# Patient Record
Sex: MALE
[De-identification: names, ages, dates, MRNs, and addresses within clinical notes are randomized per-mention and may not be internally consistent; named-entity substitution may affect disease eponyms.]

---

## 2018-02-22 ENCOUNTER — HOSPITAL ENCOUNTER (OUTPATIENT)
Dept: HOSPITAL 14 - H.ER | Age: 78
Setting detail: OBSERVATION
Discharge: HOME | End: 2018-02-22
Attending: FAMILY MEDICINE | Admitting: FAMILY MEDICINE
Payer: MEDICARE

## 2018-02-22 VITALS — RESPIRATION RATE: 18 BRPM | DIASTOLIC BLOOD PRESSURE: 94 MMHG | SYSTOLIC BLOOD PRESSURE: 138 MMHG | HEART RATE: 62 BPM

## 2018-02-22 VITALS — TEMPERATURE: 98.6 F

## 2018-02-22 DIAGNOSIS — I10: ICD-10-CM

## 2018-02-22 DIAGNOSIS — Z79.82: ICD-10-CM

## 2018-02-22 DIAGNOSIS — R07.9: Primary | ICD-10-CM

## 2018-02-22 DIAGNOSIS — Z95.1: ICD-10-CM

## 2018-02-22 DIAGNOSIS — I25.10: ICD-10-CM

## 2018-02-22 DIAGNOSIS — E78.00: ICD-10-CM

## 2018-02-22 DIAGNOSIS — E78.5: ICD-10-CM

## 2018-02-22 LAB
APTT BLD: 30 SECONDS (ref 25.6–37.1)
BASOPHILS # BLD AUTO: 0 K/UL (ref 0–0.2)
BASOPHILS # BLD AUTO: 0 K/UL (ref 0–0.2)
BASOPHILS NFR BLD: 0.7 % (ref 0–2)
BASOPHILS NFR BLD: 0.7 % (ref 0–2)
BNP SERPL-MCNC: 507 PG/ML (ref 0–900)
BUN SERPL-MCNC: 24 MG/DL (ref 9–20)
BUN SERPL-MCNC: 25 MG/DL (ref 9–20)
CALCIUM SERPL-MCNC: 9.2 MG/DL (ref 8.4–10.2)
CALCIUM SERPL-MCNC: 9.3 MG/DL (ref 8.4–10.2)
EOSINOPHIL # BLD AUTO: 0.1 K/UL (ref 0–0.7)
EOSINOPHIL # BLD AUTO: 0.1 K/UL (ref 0–0.7)
EOSINOPHIL NFR BLD: 1.8 % (ref 0–4)
EOSINOPHIL NFR BLD: 1.9 % (ref 0–4)
ERYTHROCYTE [DISTWIDTH] IN BLOOD BY AUTOMATED COUNT: 14.3 % (ref 11.5–14.5)
ERYTHROCYTE [DISTWIDTH] IN BLOOD BY AUTOMATED COUNT: 14.6 % (ref 11.5–14.5)
GFR NON-AFRICAN AMERICAN: > 60
GFR NON-AFRICAN AMERICAN: > 60
HGB BLD-MCNC: 14.4 G/DL (ref 12–18)
HGB BLD-MCNC: 15.2 G/DL (ref 12–18)
INR PPP: 1 (ref 0.9–1.2)
LYMPHOCYTES # BLD AUTO: 0.9 K/UL (ref 1–4.3)
LYMPHOCYTES # BLD AUTO: 1.5 K/UL (ref 1–4.3)
LYMPHOCYTES NFR BLD AUTO: 18.3 % (ref 20–40)
LYMPHOCYTES NFR BLD AUTO: 21.9 % (ref 20–40)
MCH RBC QN AUTO: 29.6 PG (ref 27–31)
MCH RBC QN AUTO: 29.9 PG (ref 27–31)
MCHC RBC AUTO-ENTMCNC: 33.5 G/DL (ref 33–37)
MCHC RBC AUTO-ENTMCNC: 34.1 G/DL (ref 33–37)
MCV RBC AUTO: 87.8 FL (ref 80–94)
MCV RBC AUTO: 88.4 FL (ref 80–94)
MONOCYTES # BLD: 0.3 K/UL (ref 0–0.8)
MONOCYTES # BLD: 0.5 K/UL (ref 0–0.8)
MONOCYTES NFR BLD: 5.8 % (ref 0–10)
MONOCYTES NFR BLD: 6.8 % (ref 0–10)
NEUTROPHILS # BLD: 3.8 K/UL (ref 1.8–7)
NEUTROPHILS # BLD: 4.6 K/UL (ref 1.8–7)
NEUTROPHILS NFR BLD AUTO: 68.7 % (ref 50–75)
NEUTROPHILS NFR BLD AUTO: 73.4 % (ref 50–75)
NRBC BLD AUTO-RTO: 0.1 % (ref 0–0)
NRBC BLD AUTO-RTO: 0.3 % (ref 0–0)
PLATELET # BLD: 173 K/UL (ref 130–400)
PLATELET # BLD: 176 K/UL (ref 130–400)
PMV BLD AUTO: 9.2 FL (ref 7.2–11.7)
PMV BLD AUTO: 9.2 FL (ref 7.2–11.7)
PROTHROMBIN TIME: 11.6 SECONDS (ref 9.8–13.1)
RBC # BLD AUTO: 4.79 MIL/UL (ref 4.4–5.9)
RBC # BLD AUTO: 5.14 MIL/UL (ref 4.4–5.9)
WBC # BLD AUTO: 5.2 K/UL (ref 4.8–10.8)
WBC # BLD AUTO: 6.7 K/UL (ref 4.8–10.8)

## 2018-02-22 PROCEDURE — 84484 ASSAY OF TROPONIN QUANT: CPT

## 2018-02-22 PROCEDURE — 80048 BASIC METABOLIC PNL TOTAL CA: CPT

## 2018-02-22 PROCEDURE — 99285 EMERGENCY DEPT VISIT HI MDM: CPT

## 2018-02-22 PROCEDURE — 85025 COMPLETE CBC W/AUTO DIFF WBC: CPT

## 2018-02-22 PROCEDURE — 83880 ASSAY OF NATRIURETIC PEPTIDE: CPT

## 2018-02-22 PROCEDURE — 85730 THROMBOPLASTIN TIME PARTIAL: CPT

## 2018-02-22 PROCEDURE — 71045 X-RAY EXAM CHEST 1 VIEW: CPT

## 2018-02-22 PROCEDURE — 85610 PROTHROMBIN TIME: CPT

## 2018-02-22 PROCEDURE — 93005 ELECTROCARDIOGRAM TRACING: CPT

## 2018-02-22 NOTE — RAD
HISTORY:

chest pain  



COMPARISON:

No prior. 



FINDINGS:



LUNGS:

No active pulmonary disease.



PLEURA:

Eventration of the right hemidiaphragm. No significant pleural 

effusion identified, no pneumothorax apparent.



CARDIOVASCULAR:

Prior sternotomy with sternal wires and surgical clips in place.  

Atherosclerotic aortic calcifications.  Cardiomediastinal silhouette 

enlarged.



OSSEOUS STRUCTURES:

Degenerative changes.



VISUALIZED UPPER ABDOMEN:

Normal.



OTHER FINDINGS:

None.



IMPRESSION:

No active disease.

## 2018-02-22 NOTE — ED PDOC
HPI: Chest Pain


Time Seen by Provider: 18 00:45


Chief Complaint (Nursing): Chest Pain


Chief Complaint (Provider): Chest Pain


History Per: Patient


History/Exam Limitations: no limitations


Onset/Duration Of Symptoms: Hrs (x2), Sudden Onset


Current Symptoms Are (Timing): Gone Now


Associated Symptoms: denies: Nausea, Dyspnea, Diaphoresis


Nitro Therapy Administered: 1, Per ED


Additional Complaint(s): 





77 year old male presents to ED with complaints of acute onset chest pain and 

has a past medical history of CAD, dyslipidemia, HTN, and CABG. Patient notes 

chest pain occurred x2 hours ago and resolved within seconds. Patient is 

asymptomatic upon ED arrival. (-) nausea, vomiting, diaphoresis, or SOB. 





PCP: Dr Rao





- Risk Factors


PE Risk Factors: 


   Neg: Active Cancer, Previous DVT, Previous PE


TAD Risk Factors: Pos: Hypertension





Past Medical History


Reviewed: Historical Data, Nursing Documentation, Vital Signs


Vital Signs: 


 Last Vital Signs











Temp  98.6 F   18 13:52


 


Pulse  62   18 13:52


 


Resp  18   18 13:52


 


BP  138/94 H  18 13:52


 


Pulse Ox  98   18 13:52














- Medical History


PMH: CAD, HTN, Hypercholesterolemia





- Surgical History


Surgical History: CABG ()





- Family History


Family History: States: Unknown Family Hx





- Social History


Current smoker - smoking cessation education provided: No


Ex-Smoker (has not smoked in the last 12 months): No


Alcohol: None


Drugs: Denies





- Home Medications


Home Medications: 


 Ambulatory Orders











 Medication  Instructions  Recorded


 


Aspirin [Lo-Dose Aspirin EC] 81 mg PO DAILY 18


 


Atorvastatin [Lipitor] 40 mg PO DAILY 18


 


Carvedilol [Coreg] 6.25 mg PO BID 18


 


Furosemide [Lasix] 20 mg PO DAILY 18


 


Lisinopril [Zestril] 10 mg PO DAILY 18














- Allergies


Allergies/Adverse Reactions: 


 Allergies











Allergy/AdvReac Type Severity Reaction Status Date / Time


 


prochlorperazine Allergy  VOMITING Verified 18 00:18





[From Compazine]     














AKHIL Risk Score for UA/NSTEMI





- AKHIL Risk Score


Age > 64: YES


3 or more CAD Risk Factors: YES


Known CAD (Stenosis greater than 50%): YES


AKHIL Score: 3


Risk %: 13%





Curb-65 Severity Score





- CURB-65 Severity Score


Confusion: No


Respiratory Rate greater than/equal to 30: No


Systolic BP <90 or Diastolic BP less than/equal 60mmHg: Yes


Age >64: Yes


Curb-65 Score: 2


Percentage 30-day mortality: 6.8%





Wells Criteria for PE





- Wells Criteria for Pulmonary Embolism


Clinical Signs and Symptoms of DVT: No


P.E is #1 Diagnosis, or Equally Likely: No


Heart Rate >100: No


Immobilization at least 3 days;Surgery previous 4 weeks: No


Previous, objectively diagnosed PE or DVT: No


Hemoptysis: No


Malignancy w/treatment within 6 months, or palliative: No


Total Score: 0





Review of Systems


ROS Statement: Except As Marked, All Systems Reviewed And Found Negative


Constitutional: Negative for: Sweats


Cardiovascular: Positive for: Chest Pain (resolved)


Respiratory: Negative for: Shortness of Breath


Gastrointestinal: Negative for: Nausea, Vomiting





Physical Exam





- Reviewed


Nursing Documentation Reviewed: Yes


Vital Signs Reviewed: Yes





- Physical Exam


Appears: Positive for: Non-toxic, No Acute Distress


Skin: Positive for: Normal Color, Warm, Dry


Eye Exam: Positive for: Normal appearance


Neck: Positive for: Normal


Cardiovascular/Chest: Positive for: Regular Rate, Rhythm.  Negative for: Murmur


Respiratory: Positive for: Normal Breath Sounds.  Negative for: Respiratory 

Distress


Gastrointestinal/Abdominal: Positive for: Soft.  Negative for: Tenderness


Back: Positive for: Normal Inspection


Extremity: Positive for: Normal ROM.  Negative for: Deformity


Neurologic/Psych: Positive for: Alert, Oriented.  Negative for: Motor/Sensory 

Deficits





- Laboratory Results


Result Diagrams: 


 18 10:00





 18 10:00





- ECG


O2 Sat by Pulse Oximetry: 93 (RA)


Pulse Ox Interpretation: Abnormal





Medical Decision Making


Medical Decision Makin


Initial impression: chest pain in setting of CAD


Initial plan:


* EKG


* Labs


* Trop I


* PTT/PT


* CXR





0118


* OBS TELE ADMISSION (Dr. Chung)





0132


* ASA 324mg PO


* Nitroglycerin 2% 1 ea TOP


* Re-eval





Labs reviewed show no clinically significant abnormalities.  Patient will be 

hospitalized for further observation 


DX Chest Pain


Fair


Scribe Attestation:


Documented by Stephanie Joens acting as a scribe for Rashawn Rizvi MD.





MD Scribe Attestation: 


All medical record entries made by the Teja were at my direction and 

personally dictated by me. I have reviewed the chart and agree that the record 

accurately reflects my personal performance of the history, physical exam, 

medical decision making, and the department course for this patient. I have 

also personally directed, reviewed, and agree with the discharge instructions 

and disposition.





Disposition





- Clinical Impression


Clinical Impression: 


 Chest pain








- Disposition


Disposition Time: 01:00


Condition: FAIR





- Pt Status Changed To:


Hospital Disposition Of: Observation

## 2018-02-22 NOTE — CP.PCM.HP
History of Present Illness





- History of Present Illness


History of Present Illness: 





78 y/o M with PMhx of CAD, CABG, HTN presented to ED c/o CP. At the time of 

arrival CP was already resolved. Patient was admitted for CP to R/O ACS. Denies 

SOB, palpitations, headache or paresthesias. As per patient he was being 

compliant with meds and his meds are managed by his daughter which is a 

physician in [place]. 





Present on Admission





- Present on Admission


Any Indicators Present on Admission: Yes





Review of Systems





- Review of Systems


All systems: reviewed and no additional remarkable complaints except





- Cardiovascular


Cardiovascular: Chest Pain





Past Patient History





- Past Social History


Alcohol: None


Drugs: Denies





- CARDIAC


Hx Hypercholesterolemia: Yes


Hx Hypertension: Yes





- PSYCHIATRIC


Hx Substance Use: No





- SURGICAL HISTORY


Hx Coronary Artery Bypass Graft: Yes (2009)





Meds


Allergies/Adverse Reactions: 


 Allergies











Allergy/AdvReac Type Severity Reaction Status Date / Time


 


prochlorperazine Allergy  VOMITING Verified 02/22/18 00:18





[From Compazine]     














Physical Exam





- Constitutional


Appears: Non-toxic, No Acute Distress





- Head Exam


Head Exam: NORMAL INSPECTION





- Eye Exam


Eye Exam: EOMI, PERRL





- ENT Exam


ENT Exam: Mucous Membranes Moist





- Respiratory Exam


Respiratory Exam: Clear to Auscultation Bilateral, NORMAL BREATHING PATTERN.  

absent: Decreased Breath Sounds





- Cardiovascular Exam


Cardiovascular Exam: REGULAR RHYTHM, +S1, +S2.  absent: Gallop





- GI/Abdominal Exam


GI & Abdominal Exam: Normal Bowel Sounds, Soft.  absent: Tenderness





- Neurological Exam


Neurological exam: Alert, Normal Gait, Oriented x3





- Psychiatric Exam


Psychiatric exam: Normal Affect, Normal Mood





- Skin


Skin Exam: Normal Color, Warm





Results





- Vital Signs


Recent Vital Signs: 





 Last Vital Signs











Temp  98.6 F   02/22/18 08:08


 


Pulse  53 L  02/22/18 10:08


 


Resp  21   02/22/18 10:08


 


BP  133/79   02/22/18 10:08


 


Pulse Ox  97   02/22/18 10:08














- Labs


Result Diagrams: 


 02/22/18 10:00





 02/22/18 10:00


Labs: 





 Laboratory Results - last 24 hr











  02/22/18 02/22/18 02/22/18





  01:26 01:26 01:26


 


WBC    6.7


 


RBC    4.79


 


Hgb    14.4


 


Hct    42.1


 


MCV    87.8


 


MCH    29.9


 


MCHC    34.1


 


RDW    14.3


 


Plt Count    176


 


MPV    9.2


 


Neut % (Auto)    68.7


 


Lymph % (Auto)    21.9


 


Mono % (Auto)    6.8


 


Eos % (Auto)    1.9


 


Baso % (Auto)    0.7


 


Neut # (Auto)    4.6


 


Lymph # (Auto)    1.5


 


Mono # (Auto)    0.5


 


Eos # (Auto)    0.1


 


Baso # (Auto)    0.0


 


PT   11.6 


 


INR   1.0 


 


APTT   30.0 


 


Sodium  137  


 


Potassium  3.2 L  


 


Chloride  97 L  


 


Carbon Dioxide  29  


 


Anion Gap  14  


 


BUN  25 H  


 


Creatinine  0.9  


 


Est GFR ( Amer)  > 60  


 


Est GFR (Non-Af Amer)  > 60  


 


Random Glucose  168 H  


 


Calcium  9.2  


 


Troponin I  0.0300  


 


NT-Pro-B Natriuret Pep  507  














  02/22/18 02/22/18





  10:00 10:00


 


WBC   5.2


 


RBC   5.14


 


Hgb   15.2


 


Hct   45.5


 


MCV   88.4


 


MCH   29.6


 


MCHC   33.5


 


RDW   14.6 H


 


Plt Count   173


 


MPV   9.2


 


Neut % (Auto)   73.4


 


Lymph % (Auto)   18.3 L


 


Mono % (Auto)   5.8


 


Eos % (Auto)   1.8


 


Baso % (Auto)   0.7


 


Neut # (Auto)   3.8


 


Lymph # (Auto)   0.9 L


 


Mono # (Auto)   0.3


 


Eos # (Auto)   0.1


 


Baso # (Auto)   0.0


 


PT  


 


INR  


 


APTT  


 


Sodium  138 


 


Potassium  3.8 


 


Chloride  97 L 


 


Carbon Dioxide  31 H 


 


Anion Gap  14 


 


BUN  24 H 


 


Creatinine  1.0 


 


Est GFR ( Amer)  > 60 


 


Est GFR (Non-Af Amer)  > 60 


 


Random Glucose  163 H 


 


Calcium  9.3 


 


Troponin I  0.0300 


 


NT-Pro-B Natriuret Pep  














Assessment & Plan





- Assessment and Plan (Free Text)


Assessment: 





CP to rule out ACS


CP Resolved


VS stable


Trops x1 normal


EKG: Chronic ischemic changes


Trops q8h


repeat EKG


Home meds reconciled


Can be DC home if asymptomatic after 3rd trop negative

## 2018-02-22 NOTE — CARD
--------------- APPROVED REPORT --------------





EKG Measurement

Heart [id number]

WV [id number]

[id number]

[id number]

[id number]



<Conclusion>

Sinus bradycardia with premature atrial complexes 

Left ventricular hypertrophy with repolarization abnormality

Inferior infarct, age undetermined

Anterolateral infarct, age undetermined

Abnormal ECG

## 2018-02-23 VITALS — OXYGEN SATURATION: 93 %

## 2018-02-23 NOTE — CARD
--------------- APPROVED REPORT --------------





EKG Measurement

Heart [id number]

OR [id number]

[id number]

[id number]

[id number]



<Conclusion>

Sinus bradycardia with premature atrial complexes

Inferior infarct, age undetermined

Anteroseptal infarct, age undetermined

T wave abnormality, consider lateral ischemia

Abnormal ECG

## 2018-07-10 ENCOUNTER — HOSPITAL ENCOUNTER (EMERGENCY)
Dept: HOSPITAL 14 - H.ER | Age: 78
Discharge: LEFT BEFORE DISCHARGE | End: 2018-07-10
Payer: COMMERCIAL

## 2018-07-10 VITALS — HEART RATE: 68 BPM | RESPIRATION RATE: 20 BRPM

## 2018-07-10 VITALS — SYSTOLIC BLOOD PRESSURE: 137 MMHG | DIASTOLIC BLOOD PRESSURE: 65 MMHG | TEMPERATURE: 98.6 F | OXYGEN SATURATION: 98 %

## 2018-07-10 DIAGNOSIS — I10: ICD-10-CM

## 2018-07-10 DIAGNOSIS — R10.9: ICD-10-CM

## 2018-07-10 DIAGNOSIS — R07.9: ICD-10-CM

## 2018-07-10 DIAGNOSIS — K92.2: Primary | ICD-10-CM

## 2018-07-10 DIAGNOSIS — Z87.891: ICD-10-CM

## 2018-07-10 DIAGNOSIS — I25.10: ICD-10-CM

## 2018-07-10 DIAGNOSIS — Z79.82: ICD-10-CM

## 2018-07-10 DIAGNOSIS — E78.00: ICD-10-CM

## 2018-07-10 DIAGNOSIS — Z95.1: ICD-10-CM

## 2018-07-10 LAB
ALBUMIN SERPL-MCNC: 4 G/DL (ref 3.5–5)
ALBUMIN/GLOB SERPL: 1.4 {RATIO} (ref 1–2.1)
ALT SERPL-CCNC: 31 U/L (ref 21–72)
APTT BLD: 34.1 SECONDS (ref 25.6–37.1)
AST SERPL-CCNC: 30 U/L (ref 17–59)
BASOPHILS # BLD AUTO: 0.1 K/UL (ref 0–0.2)
BASOPHILS NFR BLD: 1 % (ref 0–2)
BUN SERPL-MCNC: 24 MG/DL (ref 9–20)
CALCIUM SERPL-MCNC: 9.3 MG/DL (ref 8.4–10.2)
EOSINOPHIL # BLD AUTO: 0.2 K/UL (ref 0–0.7)
EOSINOPHIL NFR BLD: 4 % (ref 0–4)
ERYTHROCYTE [DISTWIDTH] IN BLOOD BY AUTOMATED COUNT: 14.6 % (ref 11.5–14.5)
GFR NON-AFRICAN AMERICAN: > 60
HGB BLD-MCNC: 15.4 G/DL (ref 12–18)
INR PPP: 1 (ref 0.9–1.2)
LYMPHOCYTES # BLD AUTO: 1.2 K/UL (ref 1–4.3)
LYMPHOCYTES NFR BLD AUTO: 20.8 % (ref 20–40)
MCH RBC QN AUTO: 29.2 PG (ref 27–31)
MCHC RBC AUTO-ENTMCNC: 33.1 G/DL (ref 33–37)
MCV RBC AUTO: 88.4 FL (ref 80–94)
MONOCYTES # BLD: 0.4 K/UL (ref 0–0.8)
MONOCYTES NFR BLD: 7.6 % (ref 0–10)
NEUTROPHILS # BLD: 3.9 K/UL (ref 1.8–7)
NEUTROPHILS NFR BLD AUTO: 66.6 % (ref 50–75)
NRBC BLD AUTO-RTO: 0 % (ref 0–0)
PLATELET # BLD: 149 K/UL (ref 130–400)
PMV BLD AUTO: 9.4 FL (ref 7.2–11.7)
PROTHROMBIN TIME: 10.5 SECONDS (ref 9.8–13.1)
RBC # BLD AUTO: 5.28 MIL/UL (ref 4.4–5.9)
WBC # BLD AUTO: 5.9 K/UL (ref 4.8–10.8)

## 2018-07-10 NOTE — RAD
Date of service: 



07/10/2018



HISTORY:

dyspnea  



COMPARISON:

02/22/2018 



FINDINGS:



LUNGS:

Mild bibasilar atelectasis/ scarring changes left greater than right



PLEURA:

No significant pleural effusion identified, no pneumothorax apparent.



CARDIOVASCULAR:

Cardiomegaly.  Sternotomy wires and CABG clips again noted



OSSEOUS STRUCTURES:

No significant abnormalities.



VISUALIZED UPPER ABDOMEN:

Normal.



OTHER FINDINGS:

None.



IMPRESSION:

Mild bibasilar atelectasis/ scarring changes left greater than right

## 2018-07-10 NOTE — ED PDOC
HPI: Abdomen


Time Seen by Provider: 07/10/18 07:10


Chief Complaint (Nursing): Abdominal Pain


Chief Complaint (Provider): Abdominal Pain and Black and Bloody Stools


History Per: Patient


History/Exam Limitations: no limitations


Onset/Duration Of Symptoms: Days


Outside of [place] travel?: No


Current Symptoms Are (Timing): Still Present


Severity: None


Quality Of Discomfort: "Pain"


Associated Symptoms: denies: Fever, Chills, Vomiting, Diarrhea, Chest Pain


Exacerbating Factors: None


Alleviating Factors: None


Additional Complaint(s): 


78 year old male with a past medical history of coronary artery disease, 

hypertension and hypercholesterolemia presents to the emergency department 

complaining of black and bloody stools associated with abdominal pain. Patient 

states that 3 months ago he had an episode of black stools which resolved 

spontaneously at that time but has been having intermittent episodes ever 

since. He reports that the most recent episodes of black stools occurred on 

Saturday and . Patient also notes some shortness of breath and weakness. 

Denies leg pain, dizziness, numbness/tingling, nausea, vomiting, diarrhea, 

chills. 


Chest pain yesterday, not today.





PMD: Dr. Justyna Rao ([place])








Past Medical History


Reviewed: Historical Data, Nursing Documentation, Vital Signs


Vital Signs: 


 Last Vital Signs











Temp  98.6 F   07/10/18 07:00


 


Pulse  67   07/10/18 07:00


 


Resp  16   07/10/18 07:00


 


BP  137/65   07/10/18 07:00


 


Pulse Ox  98   07/10/18 07:54














- Medical History


PMH: CAD, HTN, Hypercholesterolemia





- Surgical History


Surgical History: CABG ()





- Family History


Family History: States: Unknown Family Hx





- Social History


Current smoker - smoking cessation education provided: No


Ex-Smoker (has not smoked in the last 12 months): No


Alcohol: None


Drugs: Denies





- Home Medications


Home Medications: 


 Ambulatory Orders











 Medication  Instructions  Recorded


 


Aspirin [Lo-Dose Aspirin EC] 81 mg PO DAILY 18


 


Atorvastatin [Lipitor] 40 mg PO DAILY 18


 


Carvedilol [Coreg] 6.25 mg PO BID 18


 


Furosemide [Lasix] 20 mg PO DAILY 18


 


Lisinopril [Zestril] 10 mg PO DAILY 18














- Allergies


Allergies/Adverse Reactions: 


 Allergies











Allergy/AdvReac Type Severity Reaction Status Date / Time


 


prochlorperazine Allergy  VOMITING Verified 18 00:18





[From Compazine]     














Review of Systems


ROS Statement: Except As Marked, All Systems Reviewed And Found Negative


Constitutional: Positive for: Weakness.  Negative for: Fever, Chills


Cardiovascular: Positive for: Chest Pain


Respiratory: Positive for: Shortness of Breath


Gastrointestinal: Positive for: Abdominal Pain, Hematochezia, Other (Black and 

Bloody Stools).  Negative for: Nausea, Vomiting, Diarrhea, Rectal Pain


Neurological: Positive for: Weakness.  Negative for: Numbness, Dizziness





Physical Exam





- Reviewed


Nursing Documentation Reviewed: Yes


Vital Signs Reviewed: Yes





- Physical Exam


Appears: Positive for: Non-toxic, No Acute Distress


Head Exam: Positive for: ATRAUMATIC, NORMAL INSPECTION, NORMOCEPHALIC


Skin: Positive for: Normal Color, Warm, Dry.  Negative for: Rash


Eye Exam: Positive for: Normal appearance, EOMI, PERRL.  Negative for: Nystagmus


ENT: Positive for: Normal ENT Inspection.  Negative for: Nasal Congestion, 

Tonsillar Exudate, Tonsillar Swelling


Neck: Positive for: Normal, Painless ROM, Supple


Cardiovascular/Chest: Positive for: Regular Rate, Rhythm, Chest Non Tender.  

Negative for: Gallop, Murmur, Tachycardia


Respiratory: Positive for: Normal Breath Sounds.  Negative for: Rales, Rhonchi


Gastrointestinal/Abdominal: Positive for: Bowel Sounds, Soft, Tenderness (mild 

diffuse tenderness).  Negative for: Mass, Guarding, Rebound


Rectal: Positive for: Normal Exam (No gross stool), Rectal Tone Is: (intact).  

Negative for: Hemorrhoids, Mass, Tenderness


Extremity: Positive for: Normal ROM.  Negative for: Tenderness, Deformity, 

Swelling


Neurologic/Psych: Positive for: Alert, CNs II-XII, Oriented, Gait.  Negative for

: Motor/Sensory Deficits





- ECG


O2 Sat by Pulse Oximetry: 98 (RA)


Pulse Ox Interpretation: Normal





- Progress


ED Course And Treament: 





806:  Stable.  AAOx3.  Pt. refusing to stay in the hospital.  Ramirez not want any 

work up done at this time.  No labs, imaging, or admission.  Will go AMA.  He 

has capacity to make decisions.  Aware of possible death or decreased 

functioning from not getting evaluation and treatment for his chest pain, abd 

pain, gi bleeding.  





Medical Decision Making


Medical Decision Makin


Initial Impression:


78 year old male presenting with black bloody stools and abdominal pain





Initial Plan:


* ABO/RH Type 


* Type and Screen 


* CT ABD PELVIS PO & IV Contrast 


* EKG 


* CMP 


* Troponin 


* CBC 


* Partial Thrmboplastin 


* Prothrombin Time 


* CXR 


* NS 1000 ml  mls/hr 


* Iohexol 50 mL PO 


* Protonix inj 80 mg IVP 


* Occult Blood, Stool


* Reevaluation








--------------------------------------------------------------------------------

-------------------------


Documented by Sharyn Jeff acting as a scribe for Pako Dc MD. 





All medical record entries made by the Scribe were at my direction and 

personally dictated by me. I have reviewed the chart and agree that the record 

accurately reflects my personal performance of the history, physical exam, 

medical decision making, and the department course for this patient. I have 

also personally directed, reviewed, and agree with the discharge instructions 

and disposition.














Disposition





- Clinical Impression


Clinical Impression: 


 GI bleed, Abdominal pain, Chest pain








- Disposition


Referrals: 


[facility] [Outside]


Disposition: Against Medical Advice


Disposition Time: 08:08


Condition: FAIR


Additional Instructions: 


Return soon as possible for further evaluation and treatment.  You are going 

against medical advice.  You need blood work, treatment, catscans, and x-ray 

for evaluation; you are refusing.  You are aware of possible death or decreased 

functioning from chest pain, stomach bleeding, and abdominal pain.  


Instructions:  Chest Pain, Acute Abdomen (Belly Pain), Gastrointestinal Bleeding


Forms:  [employer] (English)

## 2018-07-11 NOTE — CARD
--------------- APPROVED REPORT --------------





Date of service: 07/10/2018



EKG Measurement

Heart [id number]

NV 208P39

[id number]

[id number]

[id number]



<Conclusion>

Sinus rhythm with premature atrial complexes

Possible Inferior infarct, age undetermined

Anteroseptal infarct, age undetermined

T wave abnormality, consider lateral ischemia

Abnormal ECG

## 2018-12-08 ENCOUNTER — HOSPITAL ENCOUNTER (EMERGENCY)
Dept: HOSPITAL 14 - H.ER | Age: 78
LOS: 1 days | Discharge: HOME | End: 2018-12-09
Payer: MEDICARE

## 2018-12-08 DIAGNOSIS — G89.29: ICD-10-CM

## 2018-12-08 DIAGNOSIS — M54.5: Primary | ICD-10-CM

## 2018-12-08 DIAGNOSIS — I10: ICD-10-CM

## 2018-12-09 VITALS — SYSTOLIC BLOOD PRESSURE: 124 MMHG | DIASTOLIC BLOOD PRESSURE: 62 MMHG | RESPIRATION RATE: 16 BRPM | HEART RATE: 72 BPM

## 2018-12-09 VITALS — OXYGEN SATURATION: 99 % | TEMPERATURE: 98 F

## 2018-12-09 NOTE — RAD
Date of service: 



12/09/2018



PROCEDURE:  Radiographs of the Lumbar Spine.



HISTORY:

acute on chronic back pain







COMPARISON:

No prior.



FINDINGS:



BONES:

Normal alignment. No listhesis. No fracture.



DISC SPACES:

Disc space narrowing at L4-5 and L5-S1 with spondylosis.



OTHER FINDINGS:

None.



IMPRESSION:

No acute fracture.

## 2018-12-09 NOTE — ED PDOC
HPI: Back


Time Seen by Provider: 12/09/18 00:49


Chief Complaint (Nursing): Back Pain


Chief Complaint (Provider): low back pain


History Per: Patient


History/Exam Limitations: no limitations


Onset/Duration Of Symptoms: Days (1)


Current Symptoms Are (Timing): Still Present


Exacerbating Factor(s): Turning, Movement


Additional Complaint(s): 





77 y/o male presents for evaluation of acute on chronic low back pain x 1 day.  

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 

ago; states every now and then he gets exacerbations of pain.  Patient states he

has not had imaging on his back for over 10 years, requesting xray.  Denies new 

trauma, fever, nausea/vomiting, chest pain, abdominal pain, numbness/weakness 

lower extremities, bowel/bladder incontinence, urinary symptoms. 





Past Medical History


Reviewed: Historical Data, Nursing Documentation, Vital Signs


Vital Signs: 


                                Last Vital Signs











Temp  98 F   12/09/18 00:16


 


Pulse  60   12/09/18 00:16


 


Resp  18   12/09/18 00:16


 


BP  127/67   12/09/18 00:16


 


Pulse Ox  99   12/09/18 00:16














- Medical History


PMH: CAD, HTN, Hypercholesterolemia





- Family History


Family History: States: No Known Family Hx





- Living Arrangements


Living Arrangements: Alone





- Social History


Current smoker - smoking cessation education provided: No


Alcohol: None


Drugs: Denies





- Allergies


Allergies/Adverse Reactions: 


                                    Allergies











Allergy/AdvReac Type Severity Reaction Status Date / Time


 


prochlorperazine Allergy  RASH Verified 12/09/18 00:15





[From Compazine]     














Review of Systems


ROS Statement: Except As Marked, All Systems Reviewed And Found Negative


Musculoskeletal: Positive for: Back Pain





Physical Exam





- Reviewed


Nursing Documentation Reviewed: Yes


Vital Signs Reviewed: Yes





- Physical Exam


Appears: Positive for: Well, Non-toxic, No Acute Distress (sleeping)


Head Exam: Positive for: ATRAUMATIC, NORMAL INSPECTION, NORMOCEPHALIC


Skin: Positive for: Normal Color


Cardiovascular/Chest: Positive for: Regular Rate, Rhythm


Respiratory: Positive for: Normal Breath Sounds


Gastrointestinal/Abdominal: Positive for: Normal Exam


Back: Positive for: Vertebral Tenderness (diffuse lspine; no skin changes, bony 

deformity), Muscle Spasm (bilateral lspine paravertebral tenderness).  Negative 

for: L CVA Tenderness, R CVA Tenderness, Decreased ROM


Extremity: Positive for: Normal ROM


Neurologic/Psych: Positive for: Alert, Oriented (x3).  Negative for: 

Motor/Sensory Deficits





- ECG


O2 Sat by Pulse Oximetry: 99





- Other Rad


  ** xray lspine


X-Ray: Viewed By Me


X-Ray Interpretation: DDD; no acute findings





- Progress


ED Course And Treament: 





-xray lspine


Patient declines pain medication at this time








Patient educated on findings, discharged with instructions to follow up PMD 

within 2-3 days


Advised Tylenol PRN pain


Return precautions given











Disposition





- Clinical Impression


Clinical Impression: 


 Acute exacerbation of chronic low back pain








- Patient ED Disposition


Is Patient to be Admitted: No


Counseled Patient/Family Regarding: Studies Performed, Diagnosis, Need For 

Followup





- Disposition


Disposition: Routine/Home


Disposition Time: 03:00


Condition: IMPROVED


Instructions:  Low Back Pain in Adults


Forms:  [facility] (English)

## 2018-12-10 ENCOUNTER — HOSPITAL ENCOUNTER (OUTPATIENT)
Dept: HOSPITAL 31 - C.ER | Age: 78
Setting detail: OBSERVATION
LOS: 2 days | Discharge: HOME | End: 2018-12-12
Attending: INTERNAL MEDICINE | Admitting: INTERNAL MEDICINE
Payer: MEDICARE

## 2018-12-10 DIAGNOSIS — Z91.81: ICD-10-CM

## 2018-12-10 DIAGNOSIS — Z95.1: ICD-10-CM

## 2018-12-10 DIAGNOSIS — Z82.3: ICD-10-CM

## 2018-12-10 DIAGNOSIS — M54.5: ICD-10-CM

## 2018-12-10 DIAGNOSIS — I25.10: ICD-10-CM

## 2018-12-10 DIAGNOSIS — I10: ICD-10-CM

## 2018-12-10 DIAGNOSIS — R00.1: Primary | ICD-10-CM

## 2018-12-10 DIAGNOSIS — E78.2: ICD-10-CM

## 2018-12-10 DIAGNOSIS — R55: ICD-10-CM

## 2018-12-10 DIAGNOSIS — G89.29: ICD-10-CM

## 2018-12-10 DIAGNOSIS — Z82.0: ICD-10-CM

## 2018-12-10 LAB
BASE EXCESS BLDV CALC-SCNC: 6.4 MMOL/L (ref 0–2)
BASOPHILS # BLD AUTO: 0.1 K/UL (ref 0–0.2)
BASOPHILS NFR BLD: 1.3 % (ref 0–2)
EOSINOPHIL # BLD AUTO: 0.2 K/UL (ref 0–0.7)
EOSINOPHIL NFR BLD: 4.2 % (ref 0–4)
ERYTHROCYTE [DISTWIDTH] IN BLOOD BY AUTOMATED COUNT: 14.7 % (ref 11.5–14.5)
HGB BLD-MCNC: 14.2 G/DL (ref 12–18)
LYMPHOCYTES # BLD AUTO: 1.2 K/UL (ref 1–4.3)
LYMPHOCYTES NFR BLD AUTO: 22 % (ref 20–40)
MCH RBC QN AUTO: 30.2 PG (ref 27–31)
MCHC RBC AUTO-ENTMCNC: 33.8 G/DL (ref 33–37)
MCV RBC AUTO: 89.3 FL (ref 80–94)
MONOCYTES # BLD: 0.4 K/UL (ref 0–0.8)
MONOCYTES NFR BLD: 7.7 % (ref 0–10)
NEUTROPHILS # BLD: 3.5 K/UL (ref 1.8–7)
NEUTROPHILS NFR BLD AUTO: 64.8 % (ref 50–75)
NRBC BLD AUTO-RTO: 0 % (ref 0–2)
PCO2 BLDV: 45 MMHG (ref 40–60)
PH BLDV: 7.45 [PH] (ref 7.32–7.43)
PLATELET # BLD: 227 K/UL (ref 130–400)
PMV BLD AUTO: 9 FL (ref 7.2–11.7)
RBC # BLD AUTO: 4.71 MIL/UL (ref 4.4–5.9)
VENOUS BLOOD GAS PO2: 50 MM/HG (ref 30–55)
WBC # BLD AUTO: 5.5 K/UL (ref 4.8–10.8)

## 2018-12-10 PROCEDURE — 83036 HEMOGLOBIN GLYCOSYLATED A1C: CPT

## 2018-12-10 PROCEDURE — 84443 ASSAY THYROID STIM HORMONE: CPT

## 2018-12-10 PROCEDURE — 84484 ASSAY OF TROPONIN QUANT: CPT

## 2018-12-10 PROCEDURE — 82948 REAGENT STRIP/BLOOD GLUCOSE: CPT

## 2018-12-10 PROCEDURE — 36415 COLL VENOUS BLD VENIPUNCTURE: CPT

## 2018-12-10 PROCEDURE — 81001 URINALYSIS AUTO W/SCOPE: CPT

## 2018-12-10 PROCEDURE — 82803 BLOOD GASES ANY COMBINATION: CPT

## 2018-12-10 PROCEDURE — 80061 LIPID PANEL: CPT

## 2018-12-10 PROCEDURE — 80053 COMPREHEN METABOLIC PANEL: CPT

## 2018-12-10 PROCEDURE — 85025 COMPLETE CBC W/AUTO DIFF WBC: CPT

## 2018-12-10 PROCEDURE — 99285 EMERGENCY DEPT VISIT HI MDM: CPT

## 2018-12-10 PROCEDURE — 93005 ELECTROCARDIOGRAM TRACING: CPT

## 2018-12-10 PROCEDURE — 93306 TTE W/DOPPLER COMPLETE: CPT

## 2018-12-10 PROCEDURE — 71045 X-RAY EXAM CHEST 1 VIEW: CPT

## 2018-12-10 NOTE — C.PDOC
History Of Present Illness





78 year old male presents to the ER with a complaint of dizzines and bradycardia

that is worse than usual today. Patient states he was previously on metoprolol, 

stopped 5 months ago due to bradycardia. Patient states he had a holter monitor 

for dizziness after stopping metoprolol, states "it was fine". Since then 

patient still with occasional similar symptoms, current symptoms are more 

prolonged than usual. Denies chest pain, SOB, or recent illness. PMHx of CAD s/p

CABG, GI bleed, HTN, and hypercholesterolemia. Currently on lisinopril and 

lasix.





Patient also complaining of exacerbation of LBP, current pain similar to rpior, 

now improved with bed rest. No current pain medications. No trauma, focal neuro 

symptoms.




















DIZZYNESS, BRADYCARDIA WORSE THAN USUAL TODAY. PS PREV ON METOPROLOL, STOPPED 5 

MO AGO DUE TO BRADYCARDIA. PS HAD HOLTER MONITOR FOR DIZZYNESS AFTER STOPPING 

METOPROLOL, "IT WAS FINE". SINCE THEN STILL W OCC SIM SX, CURRENT SX MORE 

PROLONGED THAN USUAL. NO CP, SOB. DENIES RECENT ILLNESS. past medical history of

coronary artery disease SP CABG, GI BLEED, hypertension and 

hypercholesterolemia. CURRENTLY ON LISINOPRIL, LASIX





ALSO CO EXAC CHRONIC LBP. CURRENT PAIN SIM TO PRIOR, NOW IMPROVED W BED REST. NO

CURRENT PAIN MEDS. NO TRAUMA, FOCAL NEURO SX





EXAM


NONTOXIC SBP 85


HEENT NEG


LUNGS NEG


CV RRR MINA


REMAINDER NEG


Time Seen by Provider: 12/10/18 22:58


Chief Complaint (Nursing): Dizziness/Lightheaded


History Per: Patient


History/Exam Limitations: no limitations


Onset/Duration Of Symptoms: Hrs


Current Symptoms Are (Timing): Worse


Seizure Or Post-ictal Symptoms: None


Fall Associated With With Symptoms: No


Recent travel outside of the [place]: No





- Symptoms Of CVA


Associated Symptoms: denies: Impaired Speech, Seizure Activity, New Vision 

Deficit(Left), New Vision Deficit(Right), Decreased Ability To Walk, New 

Confusion





Past Medical History


Reviewed: Historical Data, Nursing Documentation, Vital Signs


Vital Signs: 





                                Last Vital Signs











Temp  97.6 F   12/10/18 23:03


 


Pulse  57 L  12/10/18 23:03


 


Resp  14   12/10/18 23:03


 


BP  103/61   12/10/18 23:03


 


Pulse Ox  98   12/10/18 23:03














- Medical History


PMH: CAD, HTN, Hypercholesterolemia


Surgical History: CABG ()


Family History: States: Unknown Family Hx





- Social History


Hx Alcohol Use: No


Hx Substance Use: No





Review Of Systems


Except As Marked, All Systems Reviewed And Found Negative.


Constitutional: Negative for: Fever, Chills


Cardiovascular: Positive for: Other (Bradycardia).  Negative for: Palpitations


Respiratory: Negative for: Cough, Shortness of Breath


Gastrointestinal: Negative for: Nausea, Vomiting


Neurological: Positive for: Dizziness





Physical Exam





- Physical Exam


Appears: Non-toxic


Skin: Normal Color, Warm, Dry


Head: Atraumatic, Normacephalic


Eye(s): bilateral: Normal Inspection


Oral Mucosa: Moist


Neck: Normal, Supple


Chest: Symmetrical, No Tenderness


Cardiovascular: Rhythm Regular (Bradycardic), Other (SBP 85)


Respiratory: Normal Breath Sounds, No Rales, No Rhonchi, No Wheezing


Gastrointestinal/Abdominal: Soft, No Tenderness


Back: No CVA Tenderness


Extremity: Normal ROM (x4)


Neurological/Psych: Oriented x3, Normal Speech





ED Course And Treatment





- Laboratory Results


Result Diagrams: 


                                 18 06:45





                                 18 06:45


ECG: Interpreted By Me


ECG Rhythm: Sinus Bradycardia, PVC


Rate From EC


O2 Sat by Pulse Oximetry: 98


Pulse Ox Interpretation: Normal





Progress





- Re-Evaluation


Re-evaluation Note: 





12/10/18 23:51


105/54


18 00:39


PT AGREES TO ADMISSION. D/W DR MICHEAL WHARTON ON CALL WILL ADMIT. UA PENDING





- Data Reviewed


Data Reviewed: Lab, Diagnostic imaging, EKG, Old records





- Critical Care


Citical Care: Excluding Proc Time


Critical Care Time: 90 minutes





Medical Decision Making


Medical Decision Making: 





Plan:


* EKG


* Blood work


* CXR


* Urinalysis


* IV fluids








   





Disposition


Counseled Patient/Family Regarding: Studies Performed, Diagnosis





- Disposition


Disposition: HOSPITALIZED


Disposition Time: 00:39


Condition: SERIOUS





- POA


Present On Arrival: None





- Clinical Impression


Clinical Impression: 


 Near syncope, Sinus bradycardia








- Scribe Statement


The provider has reviewed the documentation as recorded by the Scribe





Senthil Young





All medical record entries made by the Scribe were at my direction and p

ersonally dictated by me. I have reviewed the chart and agree that the record 

accurately reflects my personal performance of the history, physical exam, 

medical decision making, and the department course for this patient. I have also

personally directed, reviewed, and agree with the discharge instructions and 

disposition.

## 2018-12-11 LAB
ALBUMIN SERPL-MCNC: 3.4 G/DL (ref 3.5–5)
ALBUMIN SERPL-MCNC: 3.8 G/DL (ref 3.5–5)
ALBUMIN/GLOB SERPL: 1.4 {RATIO} (ref 1–2.1)
ALBUMIN/GLOB SERPL: 1.5 {RATIO} (ref 1–2.1)
ALT SERPL-CCNC: 25 U/L (ref 21–72)
ALT SERPL-CCNC: 27 U/L (ref 21–72)
AST SERPL-CCNC: 27 U/L (ref 17–59)
AST SERPL-CCNC: 28 U/L (ref 17–59)
BASOPHILS # BLD AUTO: 0.1 K/UL (ref 0–0.2)
BASOPHILS NFR BLD: 0.9 % (ref 0–2)
BILIRUB UR-MCNC: NEGATIVE MG/DL
BUN SERPL-MCNC: 24 MG/DL (ref 9–20)
BUN SERPL-MCNC: 26 MG/DL (ref 9–20)
CALCIUM SERPL-MCNC: 8.2 MG/DL (ref 8.6–10.4)
CALCIUM SERPL-MCNC: 8.9 MG/DL (ref 8.6–10.4)
EOSINOPHIL # BLD AUTO: 0.2 K/UL (ref 0–0.7)
EOSINOPHIL NFR BLD: 3.2 % (ref 0–4)
ERYTHROCYTE [DISTWIDTH] IN BLOOD BY AUTOMATED COUNT: 14.7 % (ref 11.5–14.5)
GFR NON-AFRICAN AMERICAN: > 60
GFR NON-AFRICAN AMERICAN: > 60
GLUCOSE UR STRIP-MCNC: NORMAL MG/DL
HDLC SERPL-MCNC: 46 MG/DL (ref 30–70)
HGB BLD-MCNC: 13.3 G/DL (ref 12–18)
LDLC SERPL-MCNC: 75 MG/DL (ref 0–129)
LEUKOCYTE ESTERASE UR-ACNC: (no result) LEU/UL
LYMPHOCYTES # BLD AUTO: 1.2 K/UL (ref 1–4.3)
LYMPHOCYTES NFR BLD AUTO: 17.5 % (ref 20–40)
MCH RBC QN AUTO: 29.7 PG (ref 27–31)
MCHC RBC AUTO-ENTMCNC: 33.4 G/DL (ref 33–37)
MCV RBC AUTO: 88.7 FL (ref 80–94)
MONOCYTES # BLD: 0.6 K/UL (ref 0–0.8)
MONOCYTES NFR BLD: 9 % (ref 0–10)
NEUTROPHILS # BLD: 4.8 K/UL (ref 1.8–7)
NEUTROPHILS NFR BLD AUTO: 69.4 % (ref 50–75)
NRBC BLD AUTO-RTO: 0 % (ref 0–2)
PH UR STRIP: 5 [PH] (ref 5–8)
PLATELET # BLD: 180 K/UL (ref 130–400)
PMV BLD AUTO: 8.6 FL (ref 7.2–11.7)
PROT UR STRIP-MCNC: NEGATIVE MG/DL
RBC # BLD AUTO: 4.48 MIL/UL (ref 4.4–5.9)
RBC # UR STRIP: NEGATIVE /UL
SP GR UR STRIP: 1.02 (ref 1–1.03)
SQUAMOUS EPITHIAL: < 1 /HPF (ref 0–5)
TROPONIN I SERPL-MCNC: 0.03 NG/ML (ref 0–0.12)
UROBILINOGEN UR-MCNC: NORMAL MG/DL (ref 0.2–1)
WBC # BLD AUTO: 6.9 K/UL (ref 4.8–10.8)

## 2018-12-11 NOTE — CP.PCM.CON
History of Present Illness





- History of Present Illness


History of Present Illness: 





78-year-old gentleman with prior past medical history significant for 

hypertension and mixed hyperlipidemia. He had the prior coronary artery bypass 

surgery 10 years ago (2008) in [place] and reportedly he's been followed by his 

daughter in [place]. It's also reported that he had prior bradycardia when 

he was on metoprolol that was stopped and currently he is on carvedilol in 

addition to lisinopril and Lasix. He is admitted through the emergency 

department with dizziness and was found to have heart rate between 50 and 60/m. 

An echocardiogram was performed that revealed an apical hypokinesia however with

preserved left ventricular contractility and an ejection fraction of about 60% 

with no aortic stenosis and no pericardial effusion. Currently no dizziness and 

he is comfortable in bed with blood pressure about 100 and no postural changes. 

He is hemodynamically stable we need to stop the beta blocker and observe. From 

the coronary viewpoint he needs further follow-up possibly with a stress test as

an outpatient.





Review of Systems





- Constitutional


Constitutional: absent: Anorexia, Sleep Apnea, Weakness





- EENT


Eyes: absent: Discharge


Ears: Dizziness.  absent: Ear Discharge


Nose/Mouth/Throat: absent: Epistaxis





- Cardiovascular


Cardiovascular: Lightheadedness, Slow Heart Rate, Syncope.  absent: 

Acrocyanosis, Chest Pain, Diaphoresis, Leg Edema, Palpitations





- Respiratory


Respiratory: absent: Cough, Dyspnea, Hemoptysis





- Gastrointestinal


Gastrointestinal: absent: Diarrhea, Hematochezia, Vomiting





- Genitourinary


Genitourinary: absent: Change in Urinary Stream





Past Patient History





- Infectious Disease


Hx of Infectious Diseases: None





- Past Social History


Smoking Status: Never Smoked





- CARDIAC


Hx Hypercholesterolemia: Yes


Hx Hypertension: Yes





- PSYCHIATRIC


Hx Substance Use: No





- SURGICAL HISTORY


Hx Surgeries: Yes


Hx Coronary Artery Bypass Graft: Yes (2009)





- ANESTHESIA


Hx Anesthesia: Yes


Hx Anesthesia Reactions: No





Meds


Allergies/Adverse Reactions: 


                                    Allergies











Allergy/AdvReac Type Severity Reaction Status Date / Time


 


prochlorperazine Allergy  VOMITING Verified 02/22/18 00:18





[From Compazine]     














- Medications


Medications: 


                               Current Medications





Acetaminophen (Tylenol 325mg Tab)  650 mg PO Q6 PRN


   PRN Reason: Pain, moderate (4-7)


Aspirin (Ecotrin)  81 mg PO DAILY WILLIAM


   Last Admin: 12/11/18 11:10 Dose:  Not Given


Heparin Sodium (Porcine) (Heparin)  5,000 units SC Q12 WILLIAM


   Last Admin: 12/11/18 11:10 Dose:  Not Given


Rosuvastatin Calcium (Crestor)  20 mg PO HS WILLIAM











Physical Exam





- Constitutional


Appears: Non-toxic





- Head Exam


Head Exam: ATRAUMATIC





- Eye Exam


Eye Exam: EOMI





- ENT Exam


ENT Exam: Mucous Membranes Moist





- Neck Exam


Neck exam: Negative for: Lymphadenopathy, Thyromegaly





- Respiratory Exam


Respiratory Exam: Clear to Auscultation Bilateral.  absent: Rales





- Cardiovascular Exam


Cardiovascular Exam: REGULAR RHYTHM, Systolic Murmur





- GI/Abdominal Exam


GI & Abdominal Exam: Normal Bowel Sounds.  absent: Organomegaly, Tenderness





- Rectal Exam


Rectal Exam: Deferred





- Extremities Exam


Extremities exam: Positive for: pedal pulses present.  Negative for: calf 

tenderness





- Neurological Exam


Neurological exam: Alert, Oriented x3





- Psychiatric Exam


Psychiatric exam: Normal Affect





- Skin


Skin Exam: Dry





Results





- Vital Signs


Recent Vital Signs: 


                                Last Vital Signs











Temp  97.6 F   12/10/18 23:03


 


Pulse  58 L  12/11/18 07:10


 


Resp  18   12/11/18 06:28


 


BP  109/52 L  12/11/18 06:28


 


Pulse Ox  96   12/11/18 06:28














- Labs


Result Diagrams: 


                                 12/11/18 06:45





                                 12/11/18 06:45


Labs: 


                         Laboratory Results - last 24 hr











  12/10/18 12/10/18 12/10/18





  23:03 23:49 23:49


 


WBC   5.5 


 


RBC   4.71 


 


Hgb   14.2 


 


Hct   42.1 


 


MCV   89.3 


 


MCH   30.2 


 


MCHC   33.8 


 


RDW   14.7 H 


 


Plt Count   227 


 


MPV   9.0 


 


Neut % (Auto)   64.8 


 


Lymph % (Auto)   22.0 


 


Mono % (Auto)   7.7 


 


Eos % (Auto)   4.2 H 


 


Baso % (Auto)   1.3 


 


Neut # (Auto)   3.5 


 


Lymph # (Auto)   1.2 


 


Mono # (Auto)   0.4 


 


Eos # (Auto)   0.2 


 


Baso # (Auto)   0.1 


 


pO2   


 


VBG pH   


 


VBG pCO2   


 


VBG HCO3   


 


VBG Total CO2   


 


VBG O2 Sat (Calc)   


 


VBG Base Excess   


 


VBG Potassium   


 


Glucose   


 


Lactate   


 


Sodium    136


 


Potassium    3.7


 


Chloride    98


 


Carbon Dioxide    28


 


Anion Gap    14


 


BUN    26 H


 


Creatinine    1.1


 


Est GFR ( Amer)    > 60


 


Est GFR (Non-Af Amer)    > 60


 


POC Glucose (mg/dL)  165 H  


 


Random Glucose    164 H


 


Hemoglobin A1c   


 


Calcium    8.9


 


Total Bilirubin    0.4


 


AST    28


 


ALT    27


 


Alkaline Phosphatase    81


 


Troponin I    0.0350


 


Total Protein    6.4


 


Albumin    3.8


 


Globulin    2.6


 


Albumin/Globulin Ratio    1.5


 


Triglycerides   


 


Cholesterol   


 


LDL Cholesterol Direct   


 


HDL Cholesterol   


 


TSH 3rd Generation   


 


Venous Blood Potassium   


 


Urine Color   


 


Urine Clarity   


 


Urine pH   


 


Ur Specific Gravity   


 


Urine Protein   


 


Urine Glucose (UA)   


 


Urine Ketones   


 


Urine Blood   


 


Urine Nitrate   


 


Urine Bilirubin   


 


Urine Urobilinogen   


 


Ur Leukocyte Esterase   


 


Urine WBC (Auto)   


 


Urine RBC (Auto)   


 


Ur Squamous Epith Cells   














  12/10/18 12/11/18 12/11/18





  23:55 03:52 06:45


 


WBC    6.9


 


RBC    4.48


 


Hgb    13.3


 


Hct    39.8


 


MCV    88.7


 


MCH    29.7


 


MCHC    33.4


 


RDW    14.7 H


 


Plt Count    180


 


MPV    8.6


 


Neut % (Auto)    69.4


 


Lymph % (Auto)    17.5 L


 


Mono % (Auto)    9.0


 


Eos % (Auto)    3.2


 


Baso % (Auto)    0.9


 


Neut # (Auto)    4.8


 


Lymph # (Auto)    1.2


 


Mono # (Auto)    0.6


 


Eos # (Auto)    0.2


 


Baso # (Auto)    0.1


 


pO2  50  


 


VBG pH  7.45 H  


 


VBG pCO2  45  


 


VBG HCO3  29.6  


 


VBG Total CO2  32.7 H  


 


VBG O2 Sat (Calc)  87.9 H  


 


VBG Base Excess  6.4 H  


 


VBG Potassium  3.8  


 


Glucose  163 H  


 


Lactate  2.6 H  


 


Sodium  141.0  


 


Potassium   


 


Chloride  105.0  


 


Carbon Dioxide   


 


Anion Gap   


 


BUN   


 


Creatinine   


 


Est GFR ( Amer)   


 


Est GFR (Non-Af Amer)   


 


POC Glucose (mg/dL)   


 


Random Glucose   


 


Hemoglobin A1c   


 


Calcium   


 


Total Bilirubin   


 


AST   


 


ALT   


 


Alkaline Phosphatase   


 


Troponin I   


 


Total Protein   


 


Albumin   


 


Globulin   


 


Albumin/Globulin Ratio   


 


Triglycerides   


 


Cholesterol   


 


LDL Cholesterol Direct   


 


HDL Cholesterol   


 


TSH 3rd Generation   


 


Venous Blood Potassium  3.8  


 


Urine Color   Yellow 


 


Urine Clarity   Clear 


 


Urine pH   5.0 


 


Ur Specific Gravity   1.016 


 


Urine Protein   Negative 


 


Urine Glucose (UA)   Normal 


 


Urine Ketones   Negative 


 


Urine Blood   Negative 


 


Urine Nitrate   Negative 


 


Urine Bilirubin   Negative 


 


Urine Urobilinogen   Normal 


 


Ur Leukocyte Esterase   Neg 


 


Urine WBC (Auto)   < 1 


 


Urine RBC (Auto)   < 1 


 


Ur Squamous Epith Cells   < 1 














  12/11/18 12/11/18





  06:45 06:45


 


WBC  


 


RBC  


 


Hgb  


 


Hct  


 


MCV  


 


MCH  


 


MCHC  


 


RDW  


 


Plt Count  


 


MPV  


 


Neut % (Auto)  


 


Lymph % (Auto)  


 


Mono % (Auto)  


 


Eos % (Auto)  


 


Baso % (Auto)  


 


Neut # (Auto)  


 


Lymph # (Auto)  


 


Mono # (Auto)  


 


Eos # (Auto)  


 


Baso # (Auto)  


 


pO2  


 


VBG pH  


 


VBG pCO2  


 


VBG HCO3  


 


VBG Total CO2  


 


VBG O2 Sat (Calc)  


 


VBG Base Excess  


 


VBG Potassium  


 


Glucose  


 


Lactate  


 


Sodium  137 


 


Potassium  3.7 


 


Chloride  102 


 


Carbon Dioxide  28 


 


Anion Gap  11 


 


BUN  24 H 


 


Creatinine  0.8 


 


Est GFR ( Amer)  > 60 


 


Est GFR (Non-Af Amer)  > 60 


 


POC Glucose (mg/dL)  


 


Random Glucose  106 


 


Hemoglobin A1c   5.7


 


Calcium  8.2 L 


 


Total Bilirubin  0.5 


 


AST  27 


 


ALT  25 


 


Alkaline Phosphatase  67 


 


Troponin I  0.0330 


 


Total Protein  5.8 L 


 


Albumin  3.4 L 


 


Globulin  2.4 


 


Albumin/Globulin Ratio  1.4 


 


Triglycerides  61 


 


Cholesterol  120 


 


LDL Cholesterol Direct  75 


 


HDL Cholesterol  46 


 


TSH 3rd Generation  1.59 


 


Venous Blood Potassium  


 


Urine Color  


 


Urine Clarity  


 


Urine pH  


 


Ur Specific Gravity  


 


Urine Protein  


 


Urine Glucose (UA)  


 


Urine Ketones  


 


Urine Blood  


 


Urine Nitrate  


 


Urine Bilirubin  


 


Urine Urobilinogen  


 


Ur Leukocyte Esterase  


 


Urine WBC (Auto)  


 


Urine RBC (Auto)  


 


Ur Squamous Epith Cells  














Assessment & Plan


(1) History of coronary artery bypass surgery


Status: Chronic   


Comment: Stable no chest pain and no EKG changes, negative enzymes for MI. 

Continue observation stress test as outpatient.   





(2) Near syncope


Status: Acute   





(3) Sinus bradycardia


Status: Acute   


Comment: Sinus bradycardia on EKG on beta blocker. Hemodynamically stable no 

need for pacing. Stop beta blocker and observe.

## 2018-12-11 NOTE — CARD
--------------- APPROVED REPORT --------------





Date of service: 12/10/2018



EKG Measurement

Heart [id number]

[place] [id number]

[id number]

[id number]

[id number]



<Conclusion>

Sinus bradycardia with marked sinus arrhythmia with occasional 

premature ventricular complexes

ST & T wave abnormality, consider lateral ischemia

Abnormal ECG

## 2018-12-11 NOTE — CP.PCM.PN
Subjective





- Date & Time of Evaluation


Date of Evaluation: 12/11/18


Time of Evaluation: 10:00





- Subjective


Subjective: 





H&P dictated #[id number]





Objective





- Vital Signs/Intake and Output


Vital Signs (last 24 hours): 


                                        











Temp Pulse Resp BP Pulse Ox


 


 97.6 F   58 L  18   109/52 L  96 


 


 12/10/18 23:03  12/11/18 07:10  12/11/18 06:28  12/11/18 06:28  12/11/18 06:28








Intake and Output: 


                                        











 12/11/18 12/11/18





 06:59 18:59


 


Output Total  400


 


Balance  -400














- Medications


Medications: 


                               Current Medications





Acetaminophen (Tylenol 325mg Tab)  650 mg PO Q6 PRN


   PRN Reason: Pain, moderate (4-7)


Aspirin (Ecotrin)  81 mg PO DAILY WILLIAM


Heparin Sodium (Porcine) (Heparin)  5,000 units SC Q12 WILLIAM


Rosuvastatin Calcium (Crestor)  20 mg PO HS WILLIAM











- Labs


Labs: 


                                        





                                 12/11/18 06:45 





                                 12/11/18 06:45

## 2018-12-11 NOTE — RAD
Date of service: 



12/11/2018



PROCEDURE:  CHEST RADIOGRAPH, 1 VIEW



HISTORY:

chest pain



COMPARISON:

None available.



FINDINGS:



LUNGS:

Lung volumes shallow.  Mild apical lordotic projection.  Right 

hemidiaphragm slightly asymmetrically elevated.  No consolidation 

noted. 



PLEURA:

No pneumothorax or pleural fluid seen.



CARDIOVASCULAR:

 There is presence of aortic atherosclerotic calcification on x-ray.  

Midline sternotomy and CABG noted 



mild cardiomegaly. No significant appearing pulmonary venous 

congestion.



OSSEOUS STRUCTURES:

Mild bilateral shoulder arthrosis.



VISUALIZED UPPER ABDOMEN:

Normal.



OTHER FINDINGS:

None. 



IMPRESSION:

No linwood pulmonary venous congestion or infiltrate or pneumothorax or 

pleural effusion appreciated



Midline sternotomy and CABG.  Mild cardiomegaly.

## 2018-12-12 VITALS — DIASTOLIC BLOOD PRESSURE: 73 MMHG | HEART RATE: 56 BPM | SYSTOLIC BLOOD PRESSURE: 139 MMHG

## 2018-12-12 VITALS — TEMPERATURE: 97.7 F | RESPIRATION RATE: 18 BRPM

## 2018-12-12 VITALS — OXYGEN SATURATION: 98 %

## 2018-12-12 NOTE — CARD
--------------- APPROVED REPORT --------------





Date of service: 12/11/2018



EXAM: Two-dimensional and M-mode echocardiogram with Doppler, color 

Doppler with contrast.



Other Information 

Quality : GoodRhythm : 



INDICATION

BRADYCARDIA



2D DIMENSIONS 

IVSd1.6   (0.7-1.1cm)LVDd4.8   (3.9-5.9cm)

PWd1.2   (0.7-1.1cm)LA [id number]   (18-58mL)

LVDs3.3   (2.5-4.0cm)FS (%) 31.4   %

LVEF (%)59.2   (>50%)LVEF ([facility])61.38 %



M-Mode DIMENSIONS 

Left Atrium (MM)5.10   (2.5-4.0cm)IVSd0.92   (0.7-1.1cm)

Aortic Root4.02   (2.2-3.7cm)LVDd6.25   (4.0-5.6cm)

Aortic Cusp Exc.2.36   (1.5-2.0cm)PWd1.02   (0.7-1.1cm)

FS (%) 38   %LVDs3.89   (2.0-3.8cm)

LVEF (%)67   (>50%)



Mitral Valve

MV E Cjasnpuf90.3cm/sMV A Tzvsdcir426.3cm/sE/A ratio0.9



TDI

Lateral E' Peak V8.93cm/sMedial E' Peak V4.35cm/sE/Lateral E'10.4

E/Medial E'21.4



Tricuspid Valve

TR Peak [id number]/sTR Peak [id number]



 LEFT VENTRICLE 

The Left Ventricle is moderately dilated.

There is mild to moderate asymmetric left ventricular hypertrophy.

Left ventricle systolic function is normal. The Ejection Fraction is 

60-65%.

There is severe apical akinesis consistent with CAD.

The left ventricular diastolic function is abnormal- Grade I-abnormal 

relaxation pattern.

Liquid contrast was injected on this patient and there was no apical 

thrombus noted on this study.



 RIGHT VENTRICLE 

The right ventricle is normal size.

The right ventricular systolic function is normal.



 ATRIA 

The left atrium is moderately dilated.

The right atrium size is normal.



 AORTIC VALVE 

The aortic valve is mildlymildly sclerotic.

The aortic valve is trileaflet.

No aortic regurgitation is present.

There is no aortic valvular stenosis.

There is no aortic valvular vegetation.



 MITRAL VALVE 

Mitral annular calcification is mild.

There is no evidence of mitral valve prolapse.

There is no mitral valve stenosis.

Mitral regurgitation is mild to moderate.



 TRICUSPID VALVE 

The tricuspid valve is normal in structure.

There is trace to mild tricuspid regurgitation.

Right ventricular systolic pressure is estimated at 30-35 mmHg. 

There is no pulmonary hypertension.

There is no tricuspid valve prolapse or vegetation.

There is no tricuspid valve stenosis.



 PULMONIC VALVE 

The pulmonary valve is normal in structure.

There is no pulmonic valvular regurgitation.

There is no pulmonic valvular stenosis.



 GREAT VESSELS 

There is mild aortic root dilatation.

The IVC is normal in size and collapses >50% with inspiration.



 PERICARDIAL EFFUSION 

There is no pericardial effusion.

There is no pleural effusion.



<Conclusion>

The Left Ventricle is moderately dilated.

There is mild to moderate asymmetric left ventricular hypertrophy.

Left ventricle systolic function is normal. The Ejection Fraction is 

60-65%.

There is severe apical akinesis consistent with CAD.

Liquid contrast was injected on this patient and there was no apical 

thrombus noted on this study.

The left ventricular diastolic function is abnormal-  Grade 

I-abnormal relaxation pattern.

The right ventricle is normal size.

The right ventricular systolic function is normal.

The right atrium size is normal.

The left atrium is moderately dilated.

There is mild aortic root dilatation.

Mitral regurgitation is mild to moderate.

There is trace to mild tricuspid regurgitation.

## 2018-12-12 NOTE — HP
CHIEF COMPLIANT:  Sudden onset of dizziness, was unusual which happed

yesterday.



HISTORY OF PRESENT ILLNESS:  Mr. Abebe is a 78-year-old male with past

medical history of hypertension, hyperlipidemia, coronary artery disease,

status post CABG done in , who has been following up with his daughter,

Dr. Rao as primary care physician, who is from [place], came into

the ED, brought by EMS for symptoms of worsening dizziness.  All the

history obtained from the patient.  As per the patient, he lives in

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  While he was here, he started feeling

more dizzy, and his dizziness was worse than before, and he also had

chronic low back pain which he gets intermittent exacerbations for which he

takes bed rest, and he will have improved symptoms, but yesterday, his

dizziness was worse.  As per the patient, he was on beta blocker,

metoprolol earlier.  He developed similar symptoms, and he had a Holter

monitoring which was done, and it was negative, and the patient was advised

to stop metoprolol about 5 months ago secondary to bradycardia and

dizziness.  When I examined, he denies any headache or dizziness.  Denies

any chest pain, shortness of breath, or wheezing.  Denies any nausea,

vomiting, abdominal pain, diarrhea, or constipation.  Denies any other leg

pains or leg cramps.  Denies any other neurologic symptoms.



PAST MEDICAL HISTORY:  As described hypertension, hyperlipidemia, coronary

artery disease .



PAST SURGICAL HISTORY:  Underwent coronary artery bypass grafting in .



FAMILY HISTORY:  Parkinson's disease in father and mother  from stroke

at age 89.



PERSONAL HISTORY:  He is , living with his wife, having 2 children,

worked for [employer], and he graduated from [employer] and

also worked there, and served in the army.  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

was in the army and developed chronic low back pain.



SOCIAL HISTORY:  Denies smoking, alcohol, or drug abuse.



ALLERGIES:  HE IS ALLERGIC TO PROCHLORPERAZINE, QUESTIONABLE METOPROLOL.



MEDICATIONS:  Include Lisinopril 10 mg daily, Lasix 20 mg daily, Coreg 6.25

mg p.o. b.i.d., Lipitor 40 mg daily, aspirin 81 mg daily.



REVIEW OF SYSTEMS:  As described in history of present illness.  All other

systems reviewed and were found to be negative.



PHYSICAL EXAMINATION:

GENERAL:  Elderly male, lying in bed, in no acute distress.

VITAL SIGNS:  Blood pressure 121/45, pulse 50, respirations 20, temperature

98.3 degrees Fahrenheit, O2 sat is 97% on room air.

HEENT:  Pupils equal, round, and reacting to light and accommodation. 

Extraocular muscles intact.  No icterus.  No pallor.  No oral thrush.  No

pharyngeal congestion.

NECK:  Supple.  No JVD.

LUNGS:  Bilateral vesicular breath sounds.  No wheezing.  No rhonchi.

CARDIOVASCULAR SYSTEM:  S1, S2 present and regular.

ABDOMEN:  Soft.  Nontender.  Bowel sounds present.  No guarding.  No

rigidity.  No rebound tenderness noted.

CENTRAL NERVOUS SYSTEM:  Alert, awake, oriented x3.  No focal deficits

noted.

EXTREMITIES:  No edema.  Palpable peripheral pulses.



LABORATORY DATA:  Done from ED, WBC 5.5, hemoglobin 14.2, hematocrit 42.1,

platelets 227.  Sodium 136, potassium 3.7, chloride 98, bicarb 28, BUN 26,

creatinine 1.1, glucose 165, hemoglobin A1c 5.7, calcium 8.9, total

bilirubin 0.4, AST 28, ALT 27, alkaline phosphatase 81, cardiac enzymes x2

negative.  Triglycerides 61, cholesterol 120, LDL 75, HDL 46, TSH 1.59.  UA

negative.



Chest x-ray negative for any infiltrate, midline sternotomy and CABG, mild

cardiomegaly.  EKG consistent with sinus bradycardia at 53 beats per minute

with marked sinus arrhythmias and PVCs.



ASSESSMENT:  Elderly male with history of hypertension, hyperlipidemia,

coronary artery disease, status post coronary artery bypass grafting who

had workup done for dizziness, and his beta blockers were stopped about 5

months ago, came into the emergency department with similar symptoms of

dizziness and chronic low back pain.  The patient was found to be

bradycardic in the emergency, and the patient is being admitted for further

evaluation.

1.  Symptomatic mild bradycardia.

2.  Dizziness.

3.  Coronary artery disease, status post coronary artery bypass grafting.

4.  History of hypertension, now with borderline blood pressures.

5.  History of hyperlipidemia.



PLAN:  The patient is being admitted to cardiac telemetry.  We will do

serial cardiac enzymes.  Serial EKGs.  We will check echocardiogram which

was done this morning.  We will continue with his home medications of

aspirin 81 mg daily, Lipitor 40 mg daily.  We will hold Coreg, Lasix, and

lisinopril as his blood pressures are running low and heart rates are

running low.  We will monitor his heart rate off Coreg.  Cardiology input

appreciated.  Follow official echo report.  We will add further

recommendation as his clinical course progresses.





__________________________________________

Claudia Lai MD



DD:  2018 16:08:07

DT:  2018 2:40:23

Job # [id number]

## 2018-12-12 NOTE — CP.PCM.PN
Subjective





- Date & Time of Evaluation


Date of Evaluation: 12/12/18


Time of Evaluation: 12:04





- Subjective


Subjective: 





Discharge summary dictated #[id number]





Objective





- Vital Signs/Intake and Output


Vital Signs (last 24 hours): 


                                        











Temp Pulse Resp BP Pulse Ox


 


 97.7 F   62   18   195/99 H  96 


 


 12/12/18 07:30  12/12/18 08:00  12/12/18 07:30  12/12/18 09:07  12/12/18 08:00











- Medications


Medications: 


                               Current Medications





Acetaminophen (Tylenol 325mg Tab)  650 mg PO Q6 PRN


   PRN Reason: Pain, moderate (4-7)


Aspirin (Ecotrin)  81 mg PO DAILY [facility]


   Last Admin: 12/12/18 09:07 Dose:  81 mg


Furosemide (Lasix)  20 mg PO DAILY [facility]


   Last Admin: 12/12/18 09:07 Dose:  20 mg


Heparin Sodium (Porcine) (Heparin)  5,000 units SC Q12 [facility]


   Last Admin: 12/12/18 09:11 Dose:  5,000 units


Lisinopril (Zestril)  10 mg PO DAILY [facility]


   Last Admin: 12/12/18 09:07 Dose:  10 mg


Rosuvastatin Calcium (Crestor)  20 mg PO HS [facility]


   Last Admin: 12/11/18 21:46 Dose:  20 mg











- Labs


Labs: 


                                        





                                 12/11/18 06:45 





                                 12/11/18 06:45

## 2018-12-13 NOTE — DS
DISCHARGE DIAGNOSES:  Symptomatic bradycardia, hypertension,

hyperlipidemia, coronary artery disease, status post coronary artery bypass

grafting.



HISTORY OF PRESENT ILLNESS:  Mr. Abebe is a 78-year-old with past medical

history of hypertension, hyperlipidemia, CAD, status post CABG, who has

been following up with PMD from [place], has prior symptoms of

bradycardia and dizziness, admitted to the hospital for dizziness and found

to be having bradycardia.  Today, the patient is feeling much better. 

Denies any headache, dizziness.  Denies any chest pain, shortness of

breath, or wheezing.  Denies any nausea, vomiting, abdominal pain,

diarrhea, or constipation.  Denies any urinary complaints.  Denies any leg

pains or leg cramps.  Denies any other neurologic symptoms.  All other

systems reviewed and were found to be negative.



PHYSICAL EXAMINATION:

GENERAL:  Elderly male, lying in bed, in no acute distress.

VITAL SIGNS:  Blood pressure 139/73, pulse 56, respirations 20, temperature

98.6 degrees Fahrenheit, O2 saturation is 99% on room air.

HEENT:  Pupils are equal, round, and reacting to light and accommodation. 

Extraocular muscles intact.  No icterus.  No pallor.  No oral thrush.  No

pharyngeal congestion.

NECK:  Supple.  No JVD.

LUNGS:  Bilateral vesicular breath sounds.  No wheezing.  No rhonchi.

CARDIOVASCULAR:  S1, S2 present.  Regular.

ABDOMEN:  Soft, nontender.  Bowel sounds present.  No guarding.  No

rigidity.  No rebound tenderness noted.

CENTRAL NERVOUS SYSTEM:  Alert, awake, oriented x3.  No focal deficits

noted.

EXTREMITIES:  No edema.  Palpable peripheral pulses.



LABORATORY DATA:  Labs done yesterday:  WBC 6.9, hemoglobin 13.3,

hematocrit 39.8, platelets 180.  Sodium 137, potassium 3.7, chloride 102,

bicarbonate 28, BUN 24, creatinine 0.8, glucose 106.  Hemoglobin A1c 5.7,

calcium 8.2. Cardiac enzymes x3 negative.  LFTs within normal limits.  TSH

1.59, cholesterol is 120, triglycerides 61, LDL 75, HDL 46.  UA negative.



HOSPITAL COURSE:  The patient was admitted to the hospital.  The patient

was evaluated by Cardiology for symptomatic bradycardia.  As per

Cardiology, the patient's echocardiogram was okay, but recommended

outpatient stress test.  The patient was stopped with Coreg.  His initial

blood pressures were low.  After fluid resuscitation, his blood pressures

improved.  The patient was restarted on his medication with lisinopril and

Lasix.  The patient's heart rate is improving off Coreg.  The patient is

very anxious to be discharged and does not want to be having any further

workup in the hospital.  He wants to go back to his primary care physician

and his primary cardiologist.  The patient is cleared by Cardiology and

also the patient is willing to have all the workup done with his primary

cardiologist.  The patient is being discharged.  Advised the patient to

come back to ED if any further dizziness or any other symptoms occur.



CONDITION UPON DISCHARGE:  The patient is alert, awake, oriented x3, and

hemodynamically stable at the time of discharge.



DISCHARGE INSTRUCTIONS:  Follow up with PMD.  Follow up with Cardiology. 

Needs stress test as outpatient.

DISCHARGE MEDICATIONS:  Aspirin 81 mg daily, Lipitor 40 mg daily, Lasix 20

mg daily, lisinopril 10 mg daily.



DISCHARGE DIET:  Heart healthy diet.



ACTIVITY:  As tolerated.







__________________________________________

Claudia Lai MD



DD:  12/12/2018 20:12:55

DT:  12/13/2018 6:07:36

Job # [id number]

## 2020-09-10 ENCOUNTER — PROBLEM (OUTPATIENT)
Dept: URBAN - METROPOLITAN AREA CLINIC 19 | Facility: CLINIC | Age: 80
End: 2020-09-10

## 2020-09-10 VITALS — HEIGHT: 60 IN

## 2020-09-10 DIAGNOSIS — S05.02XD: ICD-10-CM

## 2020-09-10 DIAGNOSIS — H25.12: ICD-10-CM

## 2020-09-10 DIAGNOSIS — H35.373: ICD-10-CM

## 2020-09-10 PROCEDURE — 92250 FUNDUS PHOTOGRAPHY W/I&R: CPT

## 2020-09-10 PROCEDURE — 92235 FLUORESCEIN ANGRPH MLTIFRAME: CPT

## 2020-09-10 PROCEDURE — 92134 CPTRZ OPH DX IMG PST SGM RTA: CPT

## 2020-09-10 PROCEDURE — 92014 COMPRE OPH EXAM EST PT 1/>: CPT

## 2020-09-10 ASSESSMENT — VISUAL ACUITY: OD_SC: 7/200

## 2020-09-10 ASSESSMENT — TONOMETRY
OS_IOP_MMHG: 20
OD_IOP_MMHG: 17